# Patient Record
(demographics unavailable — no encounter records)

---

## 2025-02-27 NOTE — PHYSICAL EXAM
[Chaperone Declined] : Patient declined chaperone [Appropriately responsive] : appropriately responsive [Alert] : alert [No Acute Distress] : no acute distress [Soft] : soft [Non-tender] : non-tender [Non-distended] : non-distended [No HSM] : No HSM [No Lesions] : no lesions [No Mass] : no mass [Oriented x3] : oriented x3 [Examination Of The Breasts] : a normal appearance [No Masses] : no breast masses were palpable [Labia Majora] : normal [Labia Minora] : normal [Normal] : normal [Uterine Adnexae] : normal [FreeTextEntry5] : difficult to see iud string

## 2025-02-27 NOTE — PLAN
[FreeTextEntry1] : Well woman exam pap done mammo ordered/breast sono rt next year for iud removal rt in 1 year